# Patient Record
Sex: MALE | Race: BLACK OR AFRICAN AMERICAN | NOT HISPANIC OR LATINO | URBAN - METROPOLITAN AREA
[De-identification: names, ages, dates, MRNs, and addresses within clinical notes are randomized per-mention and may not be internally consistent; named-entity substitution may affect disease eponyms.]

---

## 2017-03-10 ENCOUNTER — EMERGENCY (EMERGENCY)
Facility: HOSPITAL | Age: 42
LOS: 0 days | Discharge: HOME | End: 2017-03-10
Attending: EMERGENCY MEDICINE
Payer: COMMERCIAL

## 2017-03-10 VITALS
HEIGHT: 73 IN | OXYGEN SATURATION: 98 % | TEMPERATURE: 98 F | SYSTOLIC BLOOD PRESSURE: 155 MMHG | DIASTOLIC BLOOD PRESSURE: 98 MMHG | WEIGHT: 309.97 LBS | RESPIRATION RATE: 18 BRPM | HEART RATE: 78 BPM

## 2017-03-10 VITALS
SYSTOLIC BLOOD PRESSURE: 142 MMHG | DIASTOLIC BLOOD PRESSURE: 78 MMHG | RESPIRATION RATE: 17 BRPM | TEMPERATURE: 98 F | HEART RATE: 84 BPM | OXYGEN SATURATION: 100 %

## 2017-03-10 DIAGNOSIS — I10 ESSENTIAL (PRIMARY) HYPERTENSION: ICD-10-CM

## 2017-03-10 DIAGNOSIS — Z88.0 ALLERGY STATUS TO PENICILLIN: ICD-10-CM

## 2017-03-10 LAB
ALBUMIN SERPL ELPH-MCNC: 4 G/DL — SIGNIFICANT CHANGE UP (ref 3.3–5)
ALP SERPL-CCNC: 81 U/L — SIGNIFICANT CHANGE UP (ref 40–120)
ALT FLD-CCNC: 37 U/L — SIGNIFICANT CHANGE UP (ref 12–78)
ANION GAP SERPL CALC-SCNC: 8 MMOL/L — SIGNIFICANT CHANGE UP (ref 5–17)
AST SERPL-CCNC: 16 U/L — SIGNIFICANT CHANGE UP (ref 15–37)
BASOPHILS # BLD AUTO: 0.1 K/UL — SIGNIFICANT CHANGE UP (ref 0–0.2)
BASOPHILS NFR BLD AUTO: 1.6 % — SIGNIFICANT CHANGE UP (ref 0–2)
BILIRUB SERPL-MCNC: 0.3 MG/DL — SIGNIFICANT CHANGE UP (ref 0.2–1.2)
BUN SERPL-MCNC: 10 MG/DL — SIGNIFICANT CHANGE UP (ref 7–23)
CALCIUM SERPL-MCNC: 8.2 MG/DL — LOW (ref 8.5–10.1)
CHLORIDE SERPL-SCNC: 108 MMOL/L — SIGNIFICANT CHANGE UP (ref 96–108)
CO2 SERPL-SCNC: 26 MMOL/L — SIGNIFICANT CHANGE UP (ref 22–31)
CREAT SERPL-MCNC: 1.17 MG/DL — SIGNIFICANT CHANGE UP (ref 0.5–1.3)
EOSINOPHIL # BLD AUTO: 0.1 K/UL — SIGNIFICANT CHANGE UP (ref 0–0.5)
EOSINOPHIL NFR BLD AUTO: 1.4 % — SIGNIFICANT CHANGE UP (ref 0–6)
GLUCOSE SERPL-MCNC: 91 MG/DL — SIGNIFICANT CHANGE UP (ref 70–99)
HCT VFR BLD CALC: 46.3 % — SIGNIFICANT CHANGE UP (ref 39–50)
HGB BLD-MCNC: 15.8 G/DL — SIGNIFICANT CHANGE UP (ref 13–17)
LYMPHOCYTES # BLD AUTO: 1.7 K/UL — SIGNIFICANT CHANGE UP (ref 1–3.3)
LYMPHOCYTES # BLD AUTO: 37.3 % — SIGNIFICANT CHANGE UP (ref 13–44)
MCHC RBC-ENTMCNC: 31.2 PG — SIGNIFICANT CHANGE UP (ref 27–34)
MCHC RBC-ENTMCNC: 34 GM/DL — SIGNIFICANT CHANGE UP (ref 32–36)
MCV RBC AUTO: 91.6 FL — SIGNIFICANT CHANGE UP (ref 80–100)
MONOCYTES # BLD AUTO: 0.3 K/UL — SIGNIFICANT CHANGE UP (ref 0–0.9)
MONOCYTES NFR BLD AUTO: 7.7 % — SIGNIFICANT CHANGE UP (ref 2–14)
NEUTROPHILS # BLD AUTO: 2.4 K/UL — SIGNIFICANT CHANGE UP (ref 1.8–7.4)
NEUTROPHILS NFR BLD AUTO: 52 % — SIGNIFICANT CHANGE UP (ref 43–77)
PLATELET # BLD AUTO: 216 K/UL — SIGNIFICANT CHANGE UP (ref 150–400)
POTASSIUM SERPL-MCNC: 4 MMOL/L — SIGNIFICANT CHANGE UP (ref 3.5–5.3)
POTASSIUM SERPL-SCNC: 4 MMOL/L — SIGNIFICANT CHANGE UP (ref 3.5–5.3)
PROT SERPL-MCNC: 7.8 GM/DL — SIGNIFICANT CHANGE UP (ref 6–8.3)
RBC # BLD: 5.05 M/UL — SIGNIFICANT CHANGE UP (ref 4.2–5.8)
RBC # FLD: 13.4 % — SIGNIFICANT CHANGE UP (ref 11–15)
SODIUM SERPL-SCNC: 142 MMOL/L — SIGNIFICANT CHANGE UP (ref 135–145)
TROPONIN I SERPL-MCNC: <.015 NG/ML — SIGNIFICANT CHANGE UP (ref 0.01–0.04)
WBC # BLD: 4.5 K/UL — SIGNIFICANT CHANGE UP (ref 3.8–10.5)
WBC # FLD AUTO: 4.5 K/UL — SIGNIFICANT CHANGE UP (ref 3.8–10.5)

## 2017-03-10 PROCEDURE — 99285 EMERGENCY DEPT VISIT HI MDM: CPT

## 2017-03-10 PROCEDURE — 70450 CT HEAD/BRAIN W/O DYE: CPT | Mod: 26

## 2017-03-10 PROCEDURE — 71020: CPT | Mod: 26

## 2017-03-10 NOTE — ED ADULT TRIAGE NOTE - CHIEF COMPLAINT QUOTE
c/o elevated bp since yesterday with chest tightness, palpitations intermittantly blurry vision, headache, dizziness, seen at pmd yesterday for same c/o given 0.2mg clonidine x 1 dose with improvement noted not currently treated for htn, no rx meds

## 2017-03-10 NOTE — ED PROVIDER NOTE - MEDICAL DECISION MAKING DETAILS
Patient with hypertension.  VSS.  Labs, EKG, CT head wnl.  Patient's BP not treated in ER.  Patient appears well and ambulatory without complain Patient with hypertension.  VSS.  Labs, EKG, CT head wnl.  Patient's BP not treated in ER since it was not significantly elevated and he is symptom free.  Patient appears well and ambulatory without complaint.  Patient says that he will go to PMDs office tomorrow and follow up as he was supposed to in order to initiate hypertension treatment.  Will not start medications here as I explained that single provider should be following him for it and am concerned that he wont follow up otherwise.  Discussed results and outcome of today's visit with the patient.  Patient advised to please follow up with their primary care doctor within the next 24 hours and return to the Emergency Department for worsening symptoms or any other concerns.  Patient advised that their doctor may call  to follow up on the specific results of the tests performed today in the emergency department.   Patient appears well on discharge.

## 2017-03-10 NOTE — ED PROVIDER NOTE - OBJECTIVE STATEMENT
Pertinent PMH/PSH/FHx/SHx and Review of Systems contained within:  Patient is a very well appearing male denying any significant pmh presents to the ED for HTN.  Says that he has been experiencing some lightheadedness and occasional blurred vision, chest tightness, palpitations, and anxiety.  Multiple BP checks have been 170's systolic.  Saw his PMD yesterday, was given clonidine in office and told to return today for recheck prior to initiating meds. Patient did not keep his appointment today because he says he felt fine.      No fever/chills, No headache/photophobia/eye pain, No ear pain/sore throat/dysphagia, No chest pain, no SOB/cough/wheeze/stridor, No abdominal pain, No N/V/D, no dysuria/frequency/discharge, No neck/back pain, no rash, no changes in neurological status/function. Patient denies EtOH/tobacco/illicit substance use. Pertinent PMH/PSH/FHx/SHx and Review of Systems contained within:  Patient is a very well appearing male denying any significant pmh presents to the ED for HTN.  Says that he has been experiencing some lightheadedness and occasional blurred vision, chest tightness, palpitations, and anxiety but currently asymptomatic.  Multiple BP checks at home have been 170's systolic.  Saw his PMD yesterday, was given clonidine in office and told to return today for recheck prior to initiating meds. Patient did not keep his appointment today because he says he felt fine.      No fever/chills, No headache/photophobia/eye pain, No ear pain/sore throat/dysphagia, No chest pain, no SOB/cough/wheeze/stridor, No abdominal pain, No N/V/D, no dysuria/frequency/discharge, No neck/back pain, no rash, no changes in neurological status/function. Patient denies EtOH/tobacco/illicit substance use.

## 2018-05-15 ENCOUNTER — EMERGENCY (EMERGENCY)
Facility: HOSPITAL | Age: 43
LOS: 0 days | Discharge: HOME | End: 2018-05-15
Attending: EMERGENCY MEDICINE
Payer: COMMERCIAL

## 2018-05-15 VITALS
WEIGHT: 315 LBS | TEMPERATURE: 99 F | SYSTOLIC BLOOD PRESSURE: 148 MMHG | HEIGHT: 73 IN | HEART RATE: 93 BPM | DIASTOLIC BLOOD PRESSURE: 105 MMHG | OXYGEN SATURATION: 94 % | RESPIRATION RATE: 18 BRPM

## 2018-05-15 VITALS
HEART RATE: 83 BPM | OXYGEN SATURATION: 98 % | RESPIRATION RATE: 17 BRPM | DIASTOLIC BLOOD PRESSURE: 94 MMHG | SYSTOLIC BLOOD PRESSURE: 136 MMHG | TEMPERATURE: 98 F

## 2018-05-15 DIAGNOSIS — Z88.0 ALLERGY STATUS TO PENICILLIN: ICD-10-CM

## 2018-05-15 DIAGNOSIS — R07.9 CHEST PAIN, UNSPECIFIED: ICD-10-CM

## 2018-05-15 DIAGNOSIS — E66.9 OBESITY, UNSPECIFIED: ICD-10-CM

## 2018-05-15 DIAGNOSIS — I10 ESSENTIAL (PRIMARY) HYPERTENSION: ICD-10-CM

## 2018-05-15 LAB
ALBUMIN SERPL ELPH-MCNC: 3.8 G/DL — SIGNIFICANT CHANGE UP (ref 3.3–5)
ALP SERPL-CCNC: 86 U/L — SIGNIFICANT CHANGE UP (ref 40–120)
ALT FLD-CCNC: 49 U/L — SIGNIFICANT CHANGE UP (ref 12–78)
ANION GAP SERPL CALC-SCNC: 7 MMOL/L — SIGNIFICANT CHANGE UP (ref 5–17)
APPEARANCE UR: CLEAR — SIGNIFICANT CHANGE UP
AST SERPL-CCNC: 18 U/L — SIGNIFICANT CHANGE UP (ref 15–37)
BASOPHILS # BLD AUTO: 0.04 K/UL — SIGNIFICANT CHANGE UP (ref 0–0.2)
BASOPHILS NFR BLD AUTO: 0.5 % — SIGNIFICANT CHANGE UP (ref 0–2)
BILIRUB SERPL-MCNC: 0.5 MG/DL — SIGNIFICANT CHANGE UP (ref 0.2–1.2)
BILIRUB UR-MCNC: NEGATIVE — SIGNIFICANT CHANGE UP
BUN SERPL-MCNC: 12 MG/DL — SIGNIFICANT CHANGE UP (ref 7–23)
CALCIUM SERPL-MCNC: 8.3 MG/DL — LOW (ref 8.5–10.1)
CHLORIDE SERPL-SCNC: 104 MMOL/L — SIGNIFICANT CHANGE UP (ref 96–108)
CK MB CFR SERPL CALC: 1 NG/ML — SIGNIFICANT CHANGE UP (ref 0.5–3.6)
CO2 SERPL-SCNC: 30 MMOL/L — SIGNIFICANT CHANGE UP (ref 22–31)
COLOR SPEC: YELLOW — SIGNIFICANT CHANGE UP
CREAT SERPL-MCNC: 1.27 MG/DL — SIGNIFICANT CHANGE UP (ref 0.5–1.3)
D DIMER BLD IA.RAPID-MCNC: <150 NG/ML DDU — SIGNIFICANT CHANGE UP
DIFF PNL FLD: NEGATIVE — SIGNIFICANT CHANGE UP
EOSINOPHIL # BLD AUTO: 0.13 K/UL — SIGNIFICANT CHANGE UP (ref 0–0.5)
EOSINOPHIL NFR BLD AUTO: 1.8 % — SIGNIFICANT CHANGE UP (ref 0–6)
GLUCOSE SERPL-MCNC: 108 MG/DL — HIGH (ref 70–99)
GLUCOSE UR QL: NEGATIVE MG/DL — SIGNIFICANT CHANGE UP
HCT VFR BLD CALC: 44 % — SIGNIFICANT CHANGE UP (ref 39–50)
HGB BLD-MCNC: 14.7 G/DL — SIGNIFICANT CHANGE UP (ref 13–17)
IMM GRANULOCYTES NFR BLD AUTO: 0.3 % — SIGNIFICANT CHANGE UP (ref 0–1.5)
KETONES UR-MCNC: NEGATIVE — SIGNIFICANT CHANGE UP
LEUKOCYTE ESTERASE UR-ACNC: NEGATIVE — SIGNIFICANT CHANGE UP
LIDOCAIN IGE QN: 71 U/L — LOW (ref 73–393)
LYMPHOCYTES # BLD AUTO: 1.63 K/UL — SIGNIFICANT CHANGE UP (ref 1–3.3)
LYMPHOCYTES # BLD AUTO: 22 % — SIGNIFICANT CHANGE UP (ref 13–44)
MAGNESIUM SERPL-MCNC: 2 MG/DL — SIGNIFICANT CHANGE UP (ref 1.6–2.6)
MCHC RBC-ENTMCNC: 30.6 PG — SIGNIFICANT CHANGE UP (ref 27–34)
MCHC RBC-ENTMCNC: 33.4 GM/DL — SIGNIFICANT CHANGE UP (ref 32–36)
MCV RBC AUTO: 91.7 FL — SIGNIFICANT CHANGE UP (ref 80–100)
MONOCYTES # BLD AUTO: 0.71 K/UL — SIGNIFICANT CHANGE UP (ref 0–0.9)
MONOCYTES NFR BLD AUTO: 9.6 % — SIGNIFICANT CHANGE UP (ref 2–14)
NEUTROPHILS # BLD AUTO: 4.89 K/UL — SIGNIFICANT CHANGE UP (ref 1.8–7.4)
NEUTROPHILS NFR BLD AUTO: 65.8 % — SIGNIFICANT CHANGE UP (ref 43–77)
NITRITE UR-MCNC: NEGATIVE — SIGNIFICANT CHANGE UP
NRBC # BLD: 0 /100 WBCS — SIGNIFICANT CHANGE UP (ref 0–0)
PH UR: 6 — SIGNIFICANT CHANGE UP (ref 5–8)
PLATELET # BLD AUTO: 194 K/UL — SIGNIFICANT CHANGE UP (ref 150–400)
POTASSIUM SERPL-MCNC: 3.9 MMOL/L — SIGNIFICANT CHANGE UP (ref 3.5–5.3)
POTASSIUM SERPL-SCNC: 3.9 MMOL/L — SIGNIFICANT CHANGE UP (ref 3.5–5.3)
PROT SERPL-MCNC: 8 GM/DL — SIGNIFICANT CHANGE UP (ref 6–8.3)
PROT UR-MCNC: NEGATIVE MG/DL — SIGNIFICANT CHANGE UP
RBC # BLD: 4.8 M/UL — SIGNIFICANT CHANGE UP (ref 4.2–5.8)
RBC # FLD: 14.6 % — HIGH (ref 10.3–14.5)
SODIUM SERPL-SCNC: 141 MMOL/L — SIGNIFICANT CHANGE UP (ref 135–145)
SP GR SPEC: 1.02 — SIGNIFICANT CHANGE UP (ref 1.01–1.02)
TROPONIN I SERPL-MCNC: <.015 NG/ML — SIGNIFICANT CHANGE UP (ref 0.01–0.04)
TROPONIN I SERPL-MCNC: <.015 NG/ML — SIGNIFICANT CHANGE UP (ref 0.01–0.04)
UROBILINOGEN FLD QL: NEGATIVE MG/DL — SIGNIFICANT CHANGE UP
WBC # BLD: 7.42 K/UL — SIGNIFICANT CHANGE UP (ref 3.8–10.5)
WBC # FLD AUTO: 7.42 K/UL — SIGNIFICANT CHANGE UP (ref 3.8–10.5)

## 2018-05-15 PROCEDURE — 71045 X-RAY EXAM CHEST 1 VIEW: CPT | Mod: 26

## 2018-05-15 PROCEDURE — 93010 ELECTROCARDIOGRAM REPORT: CPT

## 2018-05-15 PROCEDURE — 99285 EMERGENCY DEPT VISIT HI MDM: CPT | Mod: 25

## 2018-05-15 RX ORDER — DIAZEPAM 5 MG
5 TABLET ORAL ONCE
Qty: 0 | Refills: 0 | Status: DISCONTINUED | OUTPATIENT
Start: 2018-05-15 | End: 2018-05-15

## 2018-05-15 RX ORDER — KETOROLAC TROMETHAMINE 30 MG/ML
30 SYRINGE (ML) INJECTION ONCE
Qty: 0 | Refills: 0 | Status: DISCONTINUED | OUTPATIENT
Start: 2018-05-15 | End: 2018-05-15

## 2018-05-15 RX ADMIN — Medication 30 MILLIGRAM(S): at 06:51

## 2018-05-15 RX ADMIN — Medication 30 MILLIGRAM(S): at 05:10

## 2018-05-15 RX ADMIN — Medication 5 MILLIGRAM(S): at 05:10

## 2018-05-15 NOTE — ED ADULT NURSE NOTE - NS ED NURSE DC INFO COMPLEXITY
Patient asked questions/Verbalized Understanding/Straightforward: Basic instructions, no meds, no home treatment/Simple: Patient demonstrates quick and easy understanding

## 2018-05-15 NOTE — ED PROVIDER NOTE - OBJECTIVE STATEMENT
Pertinent PMH/PSH/FHx/SHx and Review of Systems contained within:  42m hx htn and obesity pw left sided cp radiating from the shoulder blade starting 2 days ago. no sob, diaphoresis, cough, fever, fatigue, syncope. patient has not taken anything for his symptoms. he does not some tingling and paresthesias of the left upper ext, intermittently for several days now  Fh and Sh not otherwise contributory  ROS otherwise negative

## 2018-05-15 NOTE — ED ADULT NURSE NOTE - OBJECTIVE STATEMENT
Patient complaining of elevated blood pressure , cough and pain that radiated to left scapula and chest . PMH : HTN

## 2018-05-15 NOTE — ED PROVIDER NOTE - MEDICAL DECISION MAKING DETAILS
patient pw cp that I suspect is musculoskeletal patient pw cp that I suspect is musculoskeletal. trop neg x2, As interpreted by ED physician, ECG is NSR with normal intervals/axis, no changes in QRS, no ST/T changes. will dc home now that patient is feeling better.

## 2018-05-15 NOTE — ED ADULT TRIAGE NOTE - CHIEF COMPLAINT QUOTE
pt c/o high blood pressure and a "shooting pain" L-scapula radiating to L-front chest, since Friday.  pt feels pain more on movement and when he coughs

## 2018-05-15 NOTE — ED PROVIDER NOTE - PHYSICAL EXAMINATION
Gen: Alert, NAD, obese  Head: NC, AT   Eyes: PERRL, EOMI, normal lids/conjunctiva  ENT: normal hearing, patent oropharynx without erythema/exudate, uvula midline  Neck: supple, no tenderness, Trachea midline  Pulm: Bilateral BS, normal resp effort, no wheeze/stridor/retractions  CV: RRR, no M/R/G, 2+ radial and dp pulses bl, no edema  Abd: soft, NT/ND, +BS, no hepatosplenomegaly  Mskel: extremities x4 with normal ROM and no joint effusions. no ctl spine ttp.   Skin: no rash, no bruising   Neuro: AAOx3, no sensory/motor deficits, CN 2-12 intact

## 2018-12-11 ENCOUNTER — EMERGENCY (EMERGENCY)
Facility: HOSPITAL | Age: 43
LOS: 1 days | Discharge: HOME | End: 2018-12-11
Attending: EMERGENCY MEDICINE | Admitting: EMERGENCY MEDICINE
Payer: MEDICAID

## 2018-12-11 VITALS
OXYGEN SATURATION: 97 % | DIASTOLIC BLOOD PRESSURE: 101 MMHG | RESPIRATION RATE: 18 BRPM | HEART RATE: 90 BPM | SYSTOLIC BLOOD PRESSURE: 179 MMHG | TEMPERATURE: 98 F

## 2018-12-11 VITALS
DIASTOLIC BLOOD PRESSURE: 102 MMHG | HEART RATE: 104 BPM | RESPIRATION RATE: 16 BRPM | SYSTOLIC BLOOD PRESSURE: 179 MMHG | OXYGEN SATURATION: 97 % | TEMPERATURE: 98 F

## 2018-12-11 PROBLEM — I10 ESSENTIAL (PRIMARY) HYPERTENSION: Chronic | Status: ACTIVE | Noted: 2017-03-10

## 2018-12-11 PROCEDURE — 99282 EMERGENCY DEPT VISIT SF MDM: CPT | Mod: 25

## 2018-12-11 NOTE — ED PROVIDER NOTE - ATTENDING CONTRIBUTION TO CARE
Patient is a 42 yo M with history of HTN here for foreign body sensation on the right side of his throat. Patient is a 42 yo M with history of HTN here for foreign body sensation on the right side of his throat. He states he was eating snapper, felt the bone on the right side of his throat. He states every time he swallows he feels pressure in his right eye and in his sinuses. No nausea, vomiting, chest pain or shortness of breath. This happened at 8 pm.     VS noted  Gen. no acute distress, Non toxic   HEENT: EOMI, mmm, pharynx w/o erythema and exudate, no stridor  Lungs: CTAB/L no C/ W /R   CVS: RRR   Abd; Soft non tender, non distended   Ext: no edema  Skin: no rash  Neuro AAOx3 non focal clear speech  a/p: foreign body in throat - ENT to be consulted.   - Kathya CAMPBELL

## 2018-12-11 NOTE — CONSULT NOTE ADULT - ASSESSMENT
Assessment:  The patient is a 43 year old male with a past medical history significant for HTN, who presents to the emergency room at [facility] with a chief complaint of throat pain for the past several hours. DDx pharyngitis versus glottic foreign body    Plan:  1) removal of glottic/pharyngeal foreign body under laryngoscopic guidance at bedside  2) diet as tolerated  3) f/u with PMD prn

## 2018-12-11 NOTE — ED ADULT NURSE NOTE - OBJECTIVE STATEMENT
Pt presents to rm 14, a&ox4, ambulatory at baseline w/o assistance, pmhx of HTN, here for evaluation of fish bone stuck in throat, pt states it is painful and can feel the pain in his eyes and sinuses when he swallows, also high BP- noncompliant w/ BP meds x3days, c/o headache 5/10. Respirations even and unlabored, airway patent, speaking in complete sentences at this time. Denies chest pain, shortness of breath, palpitations, diaphoresis, fevers, dizziness, nausea, vomiting, diarrhea, or urinary symptoms at this time. Call bell in reach, warm blanket provided, bed in lowest position, side rails up x2, MD evaluation in progress. Will continue to monitor.

## 2018-12-11 NOTE — CONSULT NOTE ADULT - SUBJECTIVE AND OBJECTIVE BOX
HPI: The patient is a 43 year old male with a past medical history significant for HTN, who presents to the emergency room at [facility] with a chief complaint of throat pain for the past several hours.  The patient was eating fish and felt himself swallow a fishbone, now with pain and scratching in right side of throat. No nausea, no vomiting, no difficulty breathing.	    HIV:    HIV Status:  · Offered: Declined	    PAST MEDICAL/SURGICAL/FAMILY/SOCIAL HISTORY:    Past Medical History:  HTN (hypertension).     Tobacco Usage:  · Tobacco Usage	Never smoker	    ALLERGIES AND HOME MEDICATIONS:   Allergies:        Allergies:  	penicillin: Drug, Unknown    Home Medications:   * Outpatient Medication Status not yet specified

## 2018-12-11 NOTE — CONSULT NOTE ADULT - CARDIOVASCULAR
Refill fax received    Losartan/hctz 100/12.5 MG tablets  Qty: 30  Sig: Take 1 tablet bymouth daily       negative Regular rate & rhythm, normal S1, S2; no murmurs, gallops or rubs; no S3, S4

## 2018-12-11 NOTE — CHART NOTE - NSCHARTNOTEFT_GEN_A_CORE
[facility]  Head & Neck Surgery Procedure Note      Name:                   Sarah Gary	      Surgeon:        Basilio Sher MD  			  Date of procedure: 12/11/2018	                   Assistant:        None    Record Number:	  [id number]                                 Anesthesia       Topical Anesthesia       Preoperative diagnosis:	Dysphagia, unspecified (R13.10), Foreign Body Larynx (T17.308A )  	  Postoperative diagnosis:	Dysphagia, unspecified (R13.10), Foreign Body Larynx (T17.308A )    Procedure:		Flexible Fiberoptic Laryngoscopy with removal of Foreign Body (70325)    INDICATION:  The patient is a 43 year old male with a past medical history significant for HTN, who presents to the emergency room at [facility] with a chief complaint of throat pain for the past several hours.  The patient was eating fish and felt himself swallow a fishbone, now with pain and scratching in right side of throat. No nausea, no vomiting, no difficulty breathing.    PROCEDURE: The patient was seen and his bilateral nasal cavities were prepped and sprayed with topical anesthesia of neosynephrine.   Following this, a fiberoptic flexible laryngoscope was passed into the left nasal cavity, and then slowly and meticulously moved posteriorly to the nasopharynx.  There was no evidence of blood within the left anterior and posterior superior lateral nasal wall. There was clear mucous within the nasal cavity.  Once at nasopharynx the skull base and lateral walls of the eustachian tubes were examined for lesions and masses.  There was no blood or masses within the nasopharynx. There was mild prominence of the nasopharyngeal soft tissue consistent with inflammatory reaction.  The fiberoptic endoscope was then carefully directed inferiorly and moved to the hypopharynx and glottis.  There was no fullness of the base of tongue.  There was 1-2+ prominence of the tonsils bilaterally (equally) and without exudate. There was no evidence of retropharyngeal erythema or edema.  There was mild  diffuse erythema  of the pharyngeal wall and supraglottic structure consistent with GERD.  There was a foreign body visualized in the glottic opening.  Next, using laryngoscopic guidance, a long tonsil clamp was inserted into the patients oral cavity and slowly moved posteriorly and inferiorly into the hypopharynx and glottis. At the level of the glottis, the foreign body was visualized with laryngoscopic guidance, and the foreign body was then grasped with the clamp and removed.  There was no evidence of any residual foreign body.  The true vocal cords appeared to be mobile bilaterally.  The airway was widely patent. The patient tolerated the procedure well. Yes

## 2018-12-11 NOTE — CONSULT NOTE ADULT - COMMENTS
Vital Signs Last 24 Hrs  T(C): 36.8 (11 Dec 2018 01:35), Max: 36.9 (11 Dec 2018 00:35)  T(F): 98.3 (11 Dec 2018 01:35), Max: 98.4 (11 Dec 2018 00:35)  HR: 90 (11 Dec 2018 01:35) (90 - 104)  BP: 179/101 (11 Dec 2018 01:35) (179/101 - 179/102)  BP(mean): --  RR: 18 (11 Dec 2018 01:35) (16 - 18)  SpO2: 97% (11 Dec 2018 01:35) (97% - 97%)

## 2018-12-11 NOTE — ED PROVIDER NOTE - CARE PROVIDER_API CALL
Basilio Sher), Otolaryngology  [address] [place] [address]  Phone: [phone]  Fax: [phone]

## 2018-12-11 NOTE — ED PROVIDER NOTE - OBJECTIVE STATEMENT
44yo M with htn p/w foreign body sensation in right side of throat. pt was eating fish and felt himself swallow a fishbone, now with pain and scratching in right side of throat. No nausea, no vomiting, no difficulty breathing.

## 2018-12-11 NOTE — CONSULT NOTE ADULT - ENMT COMMENTS
Flexible FIberoptic Laryngoscopy: clear nasopharynx to oropharynx, +foreign body in right glottis and pyriform sinus, tvc mobile b/l, airway patent

## 2018-12-28 NOTE — ED ADULT NURSE NOTE - CHIEF COMPLAINT QUOTE
C/o fishbone in throat since earlier tonight. C/o throat discomfort. Respirations even and unlabored in triage. Pt able to speak in full sentences, no drooling noted. H/o HTN (non compliant with meds) RN returned call to pt. Pt states her insurance no longer covers [facility] mail order pharmacy. Pt is requesting refill sent to her local [facility]. Refill sent.    Pt is scheduled for office visit with PCP on 1/10/19.    PARoxetine (PAXIL) 10 MG tablet 30 tablet 0 12/20/2018  No   Sig: TAKE 1 TABLET AT BEDTIME   Sent to pharmacy as: PARoxetine (PAXIL) 10 MG tablet   Class: E-Prescribe   Order: [id number]   E-Prescribing Status: Receipt confirmed by pharmacy (12/20/2018 12:05 PM CST)     Sammie Tyler RN

## 2019-05-07 ENCOUNTER — EMERGENCY (EMERGENCY)
Facility: HOSPITAL | Age: 44
LOS: 1 days | Discharge: HOME | End: 2019-05-07
Attending: EMERGENCY MEDICINE | Admitting: EMERGENCY MEDICINE
Payer: OTHER MISCELLANEOUS

## 2019-05-07 VITALS
RESPIRATION RATE: 18 BRPM | HEART RATE: 85 BPM | OXYGEN SATURATION: 99 % | TEMPERATURE: 98 F | SYSTOLIC BLOOD PRESSURE: 150 MMHG | DIASTOLIC BLOOD PRESSURE: 100 MMHG

## 2019-05-07 PROBLEM — I10 ESSENTIAL (PRIMARY) HYPERTENSION: Chronic | Status: ACTIVE | Noted: 2018-12-11

## 2019-05-07 PROCEDURE — 73120 X-RAY EXAM OF HAND: CPT | Mod: 26,RT

## 2019-05-07 PROCEDURE — 73100 X-RAY EXAM OF WRIST: CPT | Mod: 26,RT

## 2019-05-07 PROCEDURE — 99283 EMERGENCY DEPT VISIT LOW MDM: CPT

## 2019-05-07 RX ORDER — IBUPROFEN 200 MG
600 TABLET ORAL ONCE
Qty: 0 | Refills: 0 | Status: DISCONTINUED | OUTPATIENT
Start: 2019-05-07 | End: 2019-05-11

## 2019-05-07 NOTE — ED PROVIDER NOTE - UPPER EXTREMITY EXAM, RIGHT
Good cap refill, good pulses. Normal sensation, full ROM all digits, full  strength. +Tender at MCP joint of thumb and at snuffbox along pollicis longus tendon. Nontender elbow or forearm. Good cap refill, good pulses. Normal sensation, full ROM all digits, full  strength. +Tender at MCP joint of thumb and at snuffbox along pollicis longus tendon. Nontender elbow or forearm./SWELLING/TENDERNESS

## 2019-05-07 NOTE — ED PROVIDER NOTE - OBJECTIVE STATEMENT
44 y/o male with a PMHx of HTN presents to the ED c/o right thumb pain. Pt states that while he was at work on 4/27/19, his right thumb was jammed backwards when he hit it into a door. Immediately after the incident, pt experienced swelling, erythema, and numbness to the area. Pt now presenting in ED with wrist in brace reporting persistent pain and swelling. Increased pain with ROM described as a sharp pain. Pt reports taking Motrin and Tylenol 2-3x daily without any relief of symptoms; last medication usage was Panadol this morning. Denies bruising, injury or pain to other digits on the right hand, or recent fevers/illnesses. No previous injuries to the right hand. No other acute complaints at time of eval. Allergy to penicillin.

## 2019-05-07 NOTE — ED PROVIDER NOTE - CLINICAL SUMMARY MEDICAL DECISION MAKING FREE TEXT BOX
Hyperextension injury of thumb. ? tendon injury/game keepers. XR for bony tenderness at snuffbox, splint and ortho f/u. Hyperextension injury of thumb. ? tendon injury vs. game keepers. XR for bony tenderness at snuffbox, splint and ortho f/u. Hyperextension injury of thumb. ? tendon injury poss. game keepers. XR for bony tenderness at snuffbox, splint and ortho f/u.

## 2019-10-21 NOTE — ED ADULT TRIAGE NOTE - CHIEF COMPLAINT QUOTE
C/o fishbone in throat since earlier tonight. C/o throat discomfort. Respirations even and unlabored in triage. Pt able to speak in full sentences, no drooling noted. H/o HTN (non compliant with meds)
No

## 2020-05-29 NOTE — ED ADULT NURSE NOTE - DISCHARGE TEACHING
PRINCIPAL DISCHARGE DIAGNOSIS  Diagnosis: Cervical radiculopathy  Assessment and Plan of Treatment: Cervical radiculopathy
yes

## 2021-02-10 PROBLEM — Z00.00 ENCOUNTER FOR PREVENTIVE HEALTH EXAMINATION: Status: ACTIVE | Noted: 2021-02-10

## 2021-02-11 ENCOUNTER — APPOINTMENT (OUTPATIENT)
Dept: OTOLARYNGOLOGY | Facility: CLINIC | Age: 46
End: 2021-02-11

## 2022-08-04 NOTE — ED ADULT NURSE NOTE - NSIMPLEMENTINTERV_GEN_ALL_ED
Elsie Flower was seen and treated in our emergency department on 8/4/2022. He may return to work on 08/09/2022. If you have any questions or concerns, please don't hesitate to call.       Elba Ewing MD Implemented All Universal Safety Interventions:  [place] to call system. Call bell, personal items and telephone within reach. Instruct patient to call for assistance. Room bathroom lighting operational. Non-slip footwear when patient is off stretcher. Physically safe environment: no spills, clutter or unnecessary equipment. Stretcher in lowest position, wheels locked, appropriate side rails in place.

## 2023-02-02 ENCOUNTER — APPOINTMENT (OUTPATIENT)
Dept: NEUROLOGY | Facility: CLINIC | Age: 48
End: 2023-02-02
Payer: MEDICAID

## 2023-02-02 VITALS
HEIGHT: 74 IN | BODY MASS INDEX: 40.43 KG/M2 | WEIGHT: 315 LBS | HEART RATE: 77 BPM | DIASTOLIC BLOOD PRESSURE: 100 MMHG | SYSTOLIC BLOOD PRESSURE: 152 MMHG

## 2023-02-02 DIAGNOSIS — G47.33 OBSTRUCTIVE SLEEP APNEA (ADULT) (PEDIATRIC): ICD-10-CM

## 2023-02-02 PROCEDURE — 99204 OFFICE O/P NEW MOD 45 MIN: CPT

## 2023-02-02 RX ORDER — LOSARTAN POTASSIUM 100 MG/1
100 TABLET, FILM COATED ORAL DAILY
Refills: 0 | Status: ACTIVE | COMMUNITY

## 2023-02-02 RX ORDER — AMLODIPINE BESYLATE 10 MG/1
10 TABLET ORAL DAILY
Refills: 0 | Status: ACTIVE | COMMUNITY

## 2023-02-02 NOTE — HISTORY OF PRESENT ILLNESS
After Visit Summary   8/20/2018    Nate Renae    MRN: [id number]           Patient Information     Date Of Birth          1964        Visit Information        Provider Department      8/20/2018 8:00 AM Michael Ann PT [facility] [place] [facility]        Today's Diagnoses     Chronic right shoulder pain           Follow-ups after your visit        Your next 10 appointments already scheduled     Aug 27, 2018  7:20 AM CDT   [facility] Extremity with Michael Ann PT   [facility] [place] [facility] ([facility] [place])    [address]  [address]  [place] [address] [phone]              Who to contact     If you have questions or need follow up information about today's clinic visit or your schedule please contact [facility] directly at [phone].  Normal or non-critical lab and imaging results will be communicated to you by MyChart, letter or phone within 4 business days after the clinic has received the results. If you do not hear from us within 7 days, please contact the clinic through MyChart or phone. If you have a critical or abnormal lab result, we will notify you by phone as soon as possible.  Submit refill requests through [facility] or call your pharmacy and they will forward the refill request to us. Please allow 3 business days for your refill to be completed.          Additional Information About Your Visit        Care EveryWhere ID     This is your Care EveryWhere ID. This could be used by other organizations to access your [facility] medical records  [id number]         Blood Pressure from Last 3 Encounters:   06/08/07 108/70   08/15/06 114/80    Weight from Last 3 Encounters:   06/08/07 101.6 kg (224 lb)   08/15/06 95.3 kg (210 lb)              We Performed the Following     MANUAL THER TECH,1+REGIONS,EA 15 MIN     NEUROMUSCULAR RE-EDUCATION     THERAPEUTIC EXERCISES        Primary Care Provider Office  Phone # Fax #    Ruth Verner, [phone]       [facility] [address]   [facility] [address]        Equal Access to Services     KEVIN GOMEZ : Hadii aad ku hadnuhadamon Moore, wadeoda jaronqdominicha, qasundayta kamgda declan, dennis hopkins ceciliaalcides flores kendy koo. So [facility] [phone].    ATENCIÓN: Si habla español, tiene a elias disposición servicios gratuitos de asistencia lingüística. Llame al [phone].    We comply with applicable federal civil rights laws and [place] laws. We do not discriminate on the basis of race, color, national origin, age, disability, sex, sexual orientation, or gender identity.            Thank you!     Thank you for choosing [facility]  for your care. Our goal is always to provide you with excellent care. Hearing back from our patients is one way we can continue to improve our services. Please take a few minutes to complete the written survey that you may receive in the mail after your visit with us. Thank you!             Your Updated Medication List - Protect others around you: Learn how to safely use, store and throw away your medicines at www.disposemymeds.org.          This list is accurate as of 8/20/18 10:51 AM.  Always use your most recent med list.                   Brand Name Dispense Instructions for use Diagnosis    azithromycin 250 MG tablet    ZITHROMAX    6    2 TABLETS TODAY, THEN 1 TABLET DAILY THEREAFTER    Acute sinusitis, unspecified       FLUTICASONE PROPIONATE (NASAL) 50 MCG/ACT Susp     1 month    2 puffs in each nostril once daily    Chronic rhinitis          [FreeTextEntry1] : 47-year-old man complaining of episodic headaches and memory loss.\par \par He has a history of hypertension on amlodipine and losartan.  He reports he did not take his blood pressure medication this morning he is morbidly obese and reports gaining 68 pounds in the past year.  People have told him that he snores.  He may awaken not feeling rested.  He was diagnosed 12 years ago with sleep apnea but no therapy was provided.\par \par He works in [employer].  Smokes occasional cigars.\par \par No family history of memory loss.

## 2023-02-02 NOTE — CONSULT LETTER
[Dear  ___] : Dear  [unfilled], [Consult Letter:] : I had the pleasure of evaluating your patient, [unfilled]. [Please see my note below.] : Please see my note below. [Consult Closing:] : Thank you very much for allowing me to participate in the care of this patient.  If you have any questions, please do not hesitate to contact me. [Sincerely,] : Sincerely, [FreeTextEntry2] : Navin Marie MD\par [address] [facility]\par [place] [id number]

## 2023-03-23 ENCOUNTER — APPOINTMENT (OUTPATIENT)
Dept: PULMONOLOGY | Facility: CLINIC | Age: 48
End: 2023-03-23

## 2023-04-04 NOTE — ED PROVIDER NOTE - CONTACT TIME
11-Dec-2018 01:55 W Plasty Text: The lesion was extirpated to the level of the fat with a #15 scalpel blade.  Given the location of the defect, shape of the defect and the proximity to free margins a W-plasty was deemed most appropriate for repair.  Using a sterile surgical marker, the appropriate transposition arms of the W-plasty were drawn incorporating the defect and placing the expected incisions within the relaxed skin tension lines where possible.    The area thus outlined was incised deep to adipose tissue with a #15 scalpel blade.  The skin margins were undermined to an appropriate distance in all directions utilizing iris scissors.  The opposing transposition arms were then transposed into place in opposite direction and anchored with interrupted buried subcutaneous sutures.

## 2023-06-05 ENCOUNTER — APPOINTMENT (OUTPATIENT)
Dept: OPHTHALMOLOGY | Facility: CLINIC | Age: 48
End: 2023-06-05

## 2024-12-31 NOTE — ASSESSMENT
Please sign up for and monitor all results on [facility] patient portal.    Please return to the ED for any new, concerning symptoms, or worsening symptoms.    Please follow-up with your primary care provider within 1 day.  An ER visit can not replace the evaluation by your primary care physician.    In the emergency department it is our goal to rule out life-threatening conditions and make sure that you are safe to be discharged home.    Many medical conditions are difficult to diagnose and may be impossible to diagnosed during a single ER visit.  Many health conditions start with nonspecific symptoms and can only be diagnosed on follow-up visits with your primary care physician or specialist.    Please be aware that all medical conditions can change and we can not predict how you will be feeling tomorrow or the next day.   If you have any worsening or new symptoms you should not hesitate to return to the emergency department for re-evaluation.  Be sure to follow up with your primary care physician for recheck and review any labs or imaging that were performed today.  It is very common for us to identify non emergent incidental findings on labs and imaging which must be followed up with your primary care physician.   If you do not have a primary care physician, you may contact the 1 listed on your discharge paperwork or you can also call the [facility] clinic appointment desk at [phone] to schedule an appointment.     [FreeTextEntry1] : Symptoms most likely secondary to sleep apnea and he was referred to sleep specialist.  Importance of compliance with blood pressure medication stressed.  Importance of weight loss with appropriate diet and exercise also stressed.\par \par Advised to return for follow-up if his symptoms progress.